# Patient Record
Sex: FEMALE | Employment: FULL TIME | URBAN - METROPOLITAN AREA
[De-identification: names, ages, dates, MRNs, and addresses within clinical notes are randomized per-mention and may not be internally consistent; named-entity substitution may affect disease eponyms.]

---

## 2024-04-02 ENCOUNTER — OFFICE VISIT (OUTPATIENT)
Dept: OBGYN CLINIC | Age: 29
End: 2024-04-02
Payer: COMMERCIAL

## 2024-04-02 VITALS
BODY MASS INDEX: 44.41 KG/M2 | DIASTOLIC BLOOD PRESSURE: 70 MMHG | SYSTOLIC BLOOD PRESSURE: 130 MMHG | WEIGHT: 293 LBS | HEIGHT: 68 IN

## 2024-04-02 DIAGNOSIS — Z30.431 SURVEILLANCE FOR BIRTH CONTROL, INTRAUTERINE DEVICE: ICD-10-CM

## 2024-04-02 DIAGNOSIS — A60.9 HSV (HERPES SIMPLEX VIRUS) ANOGENITAL INFECTION: Primary | ICD-10-CM

## 2024-04-02 DIAGNOSIS — Z11.3 ROUTINE SCREENING FOR STI (SEXUALLY TRANSMITTED INFECTION): ICD-10-CM

## 2024-04-02 DIAGNOSIS — Z00.00 WELLNESS EXAMINATION: ICD-10-CM

## 2024-04-02 PROCEDURE — 99203 OFFICE O/P NEW LOW 30 MIN: CPT | Performed by: OBSTETRICS & GYNECOLOGY

## 2024-04-02 PROCEDURE — 99459 PELVIC EXAMINATION: CPT | Performed by: OBSTETRICS & GYNECOLOGY

## 2024-04-02 RX ORDER — VALACYCLOVIR HYDROCHLORIDE 500 MG/1
500 TABLET, FILM COATED ORAL 2 TIMES DAILY
Qty: 6 TABLET | Refills: 3 | Status: SHIPPED | OUTPATIENT
Start: 2024-04-02 | End: 2024-04-05

## 2024-04-02 NOTE — PROGRESS NOTES
Patient comes in today as a New Patient for possible herpes outbreak. Patient states she has had about 3 outbreaks this year.     LAST PAP:  unknown    LAST MAMMO:  Never    LMP:  Patient's last menstrual period was 03/13/2024 (exact date).    BIRTH CONTROL:  IUD    TOBACCO USE:  No    FAMILY HISTORY OF:   Breast Cancer:  No   Ovarian Cancer:  No   Uterine Cancer:  No   Colon Cancer:  No    Vitals:    04/02/24 1146   BP: 130/70   Site: Left Upper Arm   Position: Sitting   Weight: 133.8 kg (295 lb)   Height: 1.727 m (5' 8\")        Carla Sahu MA  04/02/24  12:09 PM  
  BP: 130/70         Physical Exam **    General:  well developed, well nourished, in no acute distress     Head:   normocephalic and atraumatic     Mouth:  no deformity or lesions with good dentition     Abdomen:  bowel sounds positive; abdomen soft and non-tender without masses, organomegaly, or hernias noted     Pelvic Exam:       External: normal female genitalia without lesions or masses. 6 mm ulcerated lesion on left labia c/w HSV      Vagina: normal without lesions or masses       Cervix: normal without lesions or masses, no CMT. Nuswab obtained. IUD strings 3 cm from os.       Adnexa: normal bimanual exam without masses or fullness       Uterus: normal by palpation, nontender      Pap smear: NOT performed     Msk:   no deformity or scoliosis noted with normal posture and gait     Extremities: no clubbing, cyanosis, edema, or deformity noted with normal full range of motion of all joints     Neurologic:  no focal deficits,normal coordination, muscle strength and tone     Skin:   intact without lesions or rashes     Psych:  alert and cooperative; normal mood and affect; normal attention span and concentration    **Chaperone used for physical exam: MARGIE Snaon MD   11:28 AM  04/03/24

## 2024-04-05 LAB
C TRACH RRNA SPEC QL NAA+PROBE: NEGATIVE
N GONORRHOEA RRNA SPEC QL NAA+PROBE: NEGATIVE
SPECIMEN SOURCE: NORMAL
T VAGINALIS RRNA SPEC QL NAA+PROBE: NEGATIVE

## 2024-10-03 ENCOUNTER — PATIENT MESSAGE (OUTPATIENT)
Dept: OBGYN CLINIC | Age: 29
End: 2024-10-03

## 2024-10-03 DIAGNOSIS — A60.9 HSV (HERPES SIMPLEX VIRUS) ANOGENITAL INFECTION: Primary | ICD-10-CM

## 2024-10-03 RX ORDER — VALACYCLOVIR HYDROCHLORIDE 500 MG/1
TABLET, FILM COATED ORAL
Qty: 6 TABLET | Refills: 4 | Status: SHIPPED | OUTPATIENT
Start: 2024-10-03

## 2024-10-03 NOTE — TELEPHONE ENCOUNTER
Valtrex refill sent for patient per their request.   Updated patient via [email or web address] and updated her that she needs to schedule AE.

## 2024-10-20 ENCOUNTER — HOSPITAL ENCOUNTER (EMERGENCY)
Age: 29
Discharge: HOME | End: 2024-10-20
Attending: EMERGENCY MEDICINE
Payer: COMMERCIAL

## 2024-10-20 VITALS
DIASTOLIC BLOOD PRESSURE: 97 MMHG | RESPIRATION RATE: 18 BRPM | SYSTOLIC BLOOD PRESSURE: 135 MMHG | HEART RATE: 78 BPM | BODY MASS INDEX: 37.89 KG/M2 | TEMPERATURE: 98.1 F | WEIGHT: 250 LBS | HEIGHT: 68 IN | OXYGEN SATURATION: 97 %

## 2024-10-20 DIAGNOSIS — N39.0 ACUTE UTI: Primary | ICD-10-CM

## 2024-10-20 LAB
APPEARANCE UR: ABNORMAL
BACTERIA URNS QL MICRO: ABNORMAL /HPF
BILIRUB UR QL: NEGATIVE
COLOR UR: ABNORMAL
EPI CELLS #/AREA URNS HPF: ABNORMAL /HPF
GLUCOSE UR STRIP.AUTO-MCNC: 100 MG/DL
HCG UR QL: NEGATIVE
HGB UR QL STRIP: ABNORMAL
HYALINE CASTS URNS QL MICRO: ABNORMAL /LPF
KETONES UR QL STRIP.AUTO: NEGATIVE MG/DL
LEUKOCYTE ESTERASE UR QL STRIP.AUTO: ABNORMAL
NITRITE UR QL STRIP.AUTO: NEGATIVE
PH UR STRIP: 6 (ref 5–9)
PROT UR STRIP-MCNC: 30 MG/DL
RBC #/AREA URNS HPF: ABNORMAL /HPF
SP GR UR REFRACTOMETRY: 1.02 (ref 1–1.02)
UROBILINOGEN UR QL STRIP.AUTO: 0.2 EU/DL (ref 0.2–1)
WBC URNS QL MICRO: >100 /HPF

## 2024-10-20 PROCEDURE — 87086 URINE CULTURE/COLONY COUNT: CPT

## 2024-10-20 PROCEDURE — 87088 URINE BACTERIA CULTURE: CPT

## 2024-10-20 PROCEDURE — 87186 SC STD MICRODIL/AGAR DIL: CPT

## 2024-10-20 PROCEDURE — 99283 EMERGENCY DEPT VISIT LOW MDM: CPT

## 2024-10-20 PROCEDURE — 81025 URINE PREGNANCY TEST: CPT

## 2024-10-20 PROCEDURE — 81001 URINALYSIS AUTO W/SCOPE: CPT

## 2024-10-20 PROCEDURE — 6370000000 HC RX 637 (ALT 250 FOR IP): Performed by: EMERGENCY MEDICINE

## 2024-10-20 RX ORDER — CEPHALEXIN 500 MG/1
500 CAPSULE ORAL 2 TIMES DAILY
Qty: 20 CAPSULE | Refills: 0 | Status: SHIPPED | OUTPATIENT
Start: 2024-10-20 | End: 2024-10-30

## 2024-10-20 RX ORDER — CEPHALEXIN 500 MG/1
500 CAPSULE ORAL 2 TIMES DAILY
Qty: 14 CAPSULE | Refills: 0 | Status: SHIPPED | OUTPATIENT
Start: 2024-10-20 | End: 2024-10-20

## 2024-10-20 RX ORDER — CEPHALEXIN 500 MG/1
500 CAPSULE ORAL
Status: COMPLETED | OUTPATIENT
Start: 2024-10-20 | End: 2024-10-20

## 2024-10-20 RX ADMIN — CEPHALEXIN 500 MG: 500 CAPSULE ORAL at 10:10

## 2024-10-20 ASSESSMENT — PAIN - FUNCTIONAL ASSESSMENT: PAIN_FUNCTIONAL_ASSESSMENT: 0-10

## 2024-10-20 ASSESSMENT — PAIN SCALES - GENERAL: PAINLEVEL_OUTOF10: 7

## 2024-10-20 NOTE — ED PROVIDER NOTES
Leukocyte Esterase, Urine MODERATE (A) NEG      WBC, UA >100 (A) U4 /hpf    RBC, UA 20-50 (A) U5 /hpf    Epithelial Cells, UA 0-5 U5 /hpf    BACTERIA, URINE 4+ (A) NEG /hpf    Hyaline Casts, UA 0-2 /lpf   Pregnancy, Urine   Result Value Ref Range    Pregnancy, Urine Negative NEG           No orders to display                No results for input(s): \"COVID19\" in the last 72 hours.    Voice dictation software was used during the making of this note.  This software is not perfect and grammatical and other typographical errors may be present.  This note has not been completely proofread for errors.     Taqueria Munoz Jr., MD  10/20/24 3567

## 2024-10-20 NOTE — ED TRIAGE NOTES
Pt reports has UTI. Reports having pain and burning x 2 days. Reports today having associated back pain. Denies f/c, N/V.

## 2024-10-20 NOTE — ED NOTES
Patient mobility status  with no difficulty. Provider aware     I have reviewed discharge instructions with the patient.  The patient verbalized understanding.    Patient left ED via Discharge Method: ambulatory to Home with self.    Opportunity for questions and clarification provided.     Patient given 1 scripts.

## 2024-10-20 NOTE — DISCHARGE INSTRUCTIONS
Take antibiotic as prescribed.  Schedule close follow-up with primary care physician.  Return if symptoms worsen or progress in any way.    We would love to help you get a primary care doctor for follow-up after your emergency department visit.  Please call [phone] between 7AM - 6PM Monday to Friday. A care navigator will be able to assist you with setting up a doctor close to your home.

## 2024-10-22 LAB
BACTERIA SPEC CULT: ABNORMAL
BACTERIA SPEC CULT: ABNORMAL
SERVICE CMNT-IMP: ABNORMAL

## 2024-10-22 NOTE — PROGRESS NOTES
ED pharmacist reviewed recent results of urine culture.    The patient was treated with cephalexin in the emergency department and received a prescription for cephalexin upon discharge. Based on culture results, the patient is being adequately treated for the identified infection with existing antimicrobial therapy. No further intervention needed.    Allergies as of 10/20/2024 - Fully Reviewed 10/20/2024   Allergen Reaction Noted    Asa [aspirin] Other (See Comments) 04/02/2024     Rosy Sam, PharmD.  Emergency Medicine Clinical Pharmacist